# Patient Record
Sex: MALE | Race: WHITE
[De-identification: names, ages, dates, MRNs, and addresses within clinical notes are randomized per-mention and may not be internally consistent; named-entity substitution may affect disease eponyms.]

---

## 2018-05-10 ENCOUNTER — HOSPITAL ENCOUNTER (EMERGENCY)
Dept: HOSPITAL 56 - MW.ED | Age: 38
Discharge: HOME | End: 2018-05-10
Payer: COMMERCIAL

## 2018-05-10 DIAGNOSIS — J03.90: Primary | ICD-10-CM

## 2018-05-10 PROCEDURE — 96372 THER/PROPH/DIAG INJ SC/IM: CPT

## 2018-05-10 PROCEDURE — 99282 EMERGENCY DEPT VISIT SF MDM: CPT

## 2018-05-10 NOTE — EDM.PDOC
ED HPI GENERAL MEDICAL PROBLEM





- General


Chief Complaint: ENT Problem


Stated Complaint: SORE THROAT


Time Seen by Provider: 05/10/18 04:44





- History of Present Illness


INITIAL COMMENTS - FREE TEXT/NARRATIVE: 





HISTORY AND PHYSICAL:





History of present illness:


The patient is a 38 y/o male with no stated medical problems who presents with 

a 24-hour history of sore throat and feeling hot but he did not take his 

temperature at home. The patient said he took Tylenol a few hours ago and took 

Motrin earlier yesterday afternoon for throat pain but did not specifically 

take his temperature. He has not had a cough chest pain or shortness of breath 

and no nausea or vomiting. Is taking fluids but not as much as he knows that he 

should be a denies any headache or sinus comfort. He says is painful to talk 

and swallow but he has been trying to hydrate.





Review of systems: 


As per history of present illness and below otherwise all systems reviewed and 

negative.





Past medical history: 


As per history of present illness and as reviewed below otherwise 

noncontributory.





Surgical history: 


As per history of present illness and as reviewed below otherwise 

noncontributory.





Social history: 


No reported history of drug or alcohol abuse.





Family history: 


As per history of present illness and as reviewed below otherwise 

noncontributory.





Physical exam:


Dermal: Well-developed mildly overweight man who is nontoxic and has slight 

hoarseness to voice but he does not have a muffled voice and is not drooling. 

Is not breathless on my evaluation.


HEENT: Atraumatic, normocephalic, pupils reactive, negative for conjunctival 

pallor or scleral icterus, mucous membranes moist, throat with bilaterally 

enlarged tonsils that are not quite kissing with exudates, uvula is midline, 

there is anterior adenopathy which is tender but no posterior adenopathy and 

there is no nuchal rigidity, neck supple, nontender, trachea midline.


Lungs: Clear to auscultation, breath sounds equal bilaterally, chest nontender. 

No stridor or wheezing or work of breathing


Heart: S1S2, regular rhythm slightly tachycardic on my evaluation


Abdomen: Soft, nondistended, nontender. NABS


Pelvis: Deferred


Genitourinary: Deferred.


Rectal: Deferred.


Extremities: Atraumatic, negative for cords or calf pain. Neurovascular 

unremarkable.


Neuro: Awake, alert, oriented. Cranial nerves II through XII unremarkable. 

Cerebellum unremarkable. Motor and sensory unremarkable throughout. Exam 

nonfocal.





Diagnostics:


[]





Therapeutics:


Decadron


Patient was offered Motrin or Tylenol for his fever and would like to defer and 

take it at home





Impression: 


Exudative tonsillitis





Definitive disposition and diagnosis as appropriate pending reevaluation and 

review of above.


  ** throat


Pain Score (Numeric/FACES): 8





- Related Data


 Allergies











Allergy/AdvReac Type Severity Reaction Status Date / Time


 


No Known Allergies Allergy   Verified 05/10/18 04:42











Home Meds: 


 Home Meds





. [No Known Home Meds]  05/10/18 [History]











Past Medical History





- Past Surgical History


GI Surgical History: Reports: Appendectomy





Social & Family History





- Family History


Family Medical History: Noncontributory





- Tobacco Use


Smoking Status *Q: Never Smoker





- Recreational Drug Use


Recreational Drug Use: No





ED ROS GENERAL





- Review of Systems


Review Of Systems: ROS reveals no pertinent complaints other than HPI.





ED EXAM, GENERAL





- Physical Exam


Exam: See Below (See dictation)





Course





- Vital Signs


Last Recorded V/S: 





 Last Vital Signs











Temp  38.3 C H  05/10/18 04:25


 


Pulse  110 H  05/10/18 04:25


 


Resp  18   05/10/18 04:25


 


BP  139/74   05/10/18 04:25


 


Pulse Ox  95   05/10/18 04:25














- Orders/Labs/Meds


Orders: 





 Active Orders 24 hr











 Category Date Time Status


 


 Dexamethasone Med  05/10/18 04:50 Once





 10 mg IM ONETIME ONE   














Departure





- Departure


Time of Disposition: 04:53


Disposition: Home, Self-Care 01


Condition: Good


Clinical Impression: 


 Exudative tonsillitis








- Discharge Information


Referrals: 


Susi Weir NP [Primary Care Provider] - 


Additional Instructions: 


The following information is given to patients seen in the emergency department 

who are being discharged to home. This information is to outline your options 

for follow-up care. We provide all patients seen in our emergency department 

with a follow-up referral.





The need for follow-up, as well as the timing and circumstances, are variable 

depending upon the specifics of your emergency department visit.





If you don't have a primary care physician on staff, we will provide you with a 

referral. We always advise you to contact your personal physician following an 

emergency department visit to inform them of the circumstance of the visit and 

for follow-up with them and/or the need for any referrals to a consulting 

specialist.





The emergency department will also refer you to a specialist when appropriate. 

This referral assures that you have the opportunity for followup care with a 

specialist. All of these measure are taken in an effort to provide you with 

optimal care, which includes your followup.





Under all circumstances we always encourage you to contact your private 

physician who remains a resource for coordinating  your care. When calling for 

followup care, please make the office aware that this follow-up is from your 

recent emergency room visit. If for any reason you are refused follow-up, 

please contact the [facility] emergency 

department at [phone] and ask to speak to the emergency department 

charge nurse.





[facility] 


Primary care- [facility] [address]


[phone]








Please try to push hydration more than you have been doing, rest, use over-the-

counter Tylenol and ibuprofen for fevers as we discussed. Please take 

antibiotics, Augmentin, until they are finished. Please use Tylenol with 

codeine elixir as needed for pain as prescribed to you from [facility]. Please 

contact the clinic and schedule a follow-up in the next few days and return to 

ER as needed and as discussed





- My Orders


Last 24 Hours: 





My Active Orders





05/10/18 04:50


Dexamethasone   10 mg IM ONETIME ONE 














- Assessment/Plan


Last 24 Hours: 





My Active Orders





05/10/18 04:50


Dexamethasone   10 mg IM ONETIME ONE

## 2023-02-02 ENCOUNTER — HOSPITAL ENCOUNTER (EMERGENCY)
Dept: HOSPITAL 56 - MW.ED | Age: 43
LOS: 1 days | Discharge: HOME | End: 2023-02-03
Payer: COMMERCIAL

## 2023-02-02 DIAGNOSIS — R07.89: Primary | ICD-10-CM

## 2023-02-02 PROCEDURE — 71045 X-RAY EXAM CHEST 1 VIEW: CPT

## 2023-02-02 PROCEDURE — 80053 COMPREHEN METABOLIC PANEL: CPT

## 2023-02-02 PROCEDURE — 93005 ELECTROCARDIOGRAM TRACING: CPT

## 2023-02-02 PROCEDURE — 36415 COLL VENOUS BLD VENIPUNCTURE: CPT

## 2023-02-02 PROCEDURE — 85025 COMPLETE CBC W/AUTO DIFF WBC: CPT

## 2023-02-02 PROCEDURE — 99285 EMERGENCY DEPT VISIT HI MDM: CPT

## 2023-02-02 PROCEDURE — 84484 ASSAY OF TROPONIN QUANT: CPT

## 2023-02-03 LAB
BUN SERPL-MCNC: 13 MG/DL (ref 7–18)
CHLORIDE SERPL-SCNC: 105 MMOL/L (ref 98–107)
CO2 SERPL-SCNC: 26.7 MMOL/L (ref 21–32)
EGFRCR SERPLBLD CKD-EPI 2021: 110 ML/MIN (ref 60–?)
GLUCOSE SERPL-MCNC: 125 MG/DL (ref 74–106)
POTASSIUM SERPL-SCNC: 3.6 MMOL/L (ref 3.5–5.1)
SODIUM SERPL-SCNC: 140 MMOL/L (ref 136–148)